# Patient Record
Sex: MALE | URBAN - METROPOLITAN AREA
[De-identification: names, ages, dates, MRNs, and addresses within clinical notes are randomized per-mention and may not be internally consistent; named-entity substitution may affect disease eponyms.]

---

## 2017-04-11 DIAGNOSIS — M54.2 CERVICALGIA: Primary | ICD-10-CM

## 2017-04-13 ENCOUNTER — OFFICE VISIT (OUTPATIENT)
Dept: ORTHOPEDICS | Facility: CLINIC | Age: 42
End: 2017-04-13

## 2017-04-13 VITALS — BODY MASS INDEX: 21.68 KG/M2 | HEIGHT: 64 IN | WEIGHT: 127 LBS

## 2017-04-13 DIAGNOSIS — R51.9 NONINTRACTABLE EPISODIC HEADACHE, UNSPECIFIED HEADACHE TYPE: ICD-10-CM

## 2017-04-13 DIAGNOSIS — M54.2 NECK PAIN: Primary | ICD-10-CM

## 2017-04-13 NOTE — MR AVS SNAPSHOT
After Visit Summary   4/13/2017    Loc     MRN: [id number]           Patient Information     Date Of Birth          1975        Visit Information        Provider Department      4/13/2017 1:00 PM Darren Sexton MD [facility]        Today's Diagnoses     Neck pain    -  1    Nonintractable episodic headache, unspecified headache type           Follow-ups after your visit        Your next 10 appointments already scheduled     Apr 21, 2017  1:45 PM CDT   (Arrive by 1:30 PM)   MR CERVICAL SPINE W/O CONTRAST with [id number]   [facility] ([facility])    [address] [address] [phone]           Take your medicines as usual, unless your doctor tells you not to. Bring a list of your current medicines to your exam (including vitamins, minerals and over-the-counter drugs). Also bring the results of similar scans you may have had.  Please remove any body piercings and hair extensions before you arrive.  Follow your doctor s orders. If you do not, we may have to postpone your exam.  You will not have contrast for this exam. You do not need to do anything special to prepare.  The MRI machine uses a strong magnet. Please wear clothes without metal (snaps, zippers). A sweatsuit works well, or we may give you a hospital gown.   **IMPORTANT** THE INSTRUCTIONS BELOW ARE ONLY FOR THOSE PATIENTS WHO HAVE BEEN TOLD THEY WILL RECEIVE SEDATION OR GENERAL ANESTHESIA DURING THEIR MRI PROCEDURE:  IF YOU WILL RECEIVE SEDATION (take medicine to help you relax during your exam):   You must get the medicine from your doctor before you arrive. Bring the medicine to the exam. Do not take it at home.   Arrive one hour early. Bring someone who can take you home after the test. Your medicine will make you sleepy. After the exam, you may not drive, take a bus or take a taxi by yourself.   No eating 8 hours before your exam.  You may have clear liquids up until 4 hours before your exam. (Clear liquids include water, clear tea, black coffee and fruit juice without pulp.)  IF YOU WILL RECEIVE ANESTHESIA (be asleep for your exam):   Arrive 1 1/2 hours early. Bring someone who can take you home after the test. You may not drive, take a bus or take a taxi by yourself.   No eating 8 hours before your exam. You may have clear liquids up until 4 hours before your exam. (Clear liquids include water, clear tea, black coffee and fruit juice without pulp.)   You will spend four to five hours in the recovery room.  Please call the Imaging Department at your exam site with any questions.            Apr 27, 2017 11:00 AM CDT   (Arrive by 10:30 AM)   RETURN NECK with Darren Sexton MD   [facility] ([facility])    [address] [place] [address]  [address] [facility] [id number] [phone]              Future tests that were ordered for you today     Open Future Orders        Priority Expected Expires Ordered    MRI Cervical spine w/o contrast Routine  4/13/2018 4/13/2017            Who to contact     Please call your clinic at [phone] to:    Ask questions about your health    Make or cancel appointments    Discuss your medicines    Learn about your test results    Speak to your doctor   If you have compliments or concerns about an experience at your clinic, or if you wish to file a complaint, please contact [facility] Patient Relations at [phone] or email us at [email or web address]         Additional Information About Your Visit        TocomailharAnedot Information     [employer] is an electronic gateway that provides easy, online access to your medical records. With [employer], you can request a clinic appointment, read your test results, renew a prescription or communicate with your care team.     To sign up for [employer] visit the website at [email or web address]  "  You will be asked to enter the access code listed below, as well as some personal information. Please follow the directions to create your username and password.     Your access code is: [email or web address]  Expires: 2017  6:30 AM     Your access code will  in 90 days. If you need help or a new code, please contact your [facility] or call [phone] for assistance.        Care EveryWhere ID     This is your Care EveryWhere ID. This could be used by other organizations to access your [facility] medical records  [id number]        Your Vitals Were     Height BMI (Body Mass Index)                1.632 m (5' 4.25\") 21.63 kg/m2           Blood Pressure from Last 3 Encounters:   No data found for BP    Weight from Last 3 Encounters:   17 57.6 kg (127 lb)               Primary Care Provider    No Ref-Primary Verified       No address on file        Thank you!     Thank you for choosing [facility]  for your care. Our goal is always to provide you with excellent care. Hearing back from our patients is one way we can continue to improve our services. Please take a few minutes to complete the written survey that you may receive in the mail after your visit with us. Thank you!             Your Updated Medication List - Protect others around you: Learn how to safely use, store and throw away your medicines at www.disposemymeds.org.          This list is accurate as of: 17  2:17 PM.  Always use your most recent med list.                   Brand Name Dispense Instructions for use    NORTRIPTYLINE HCL PO      Take 20 mg by mouth At Bedtime       TYLENOL PO      Take by mouth as needed for mild pain or fever         "

## 2017-04-13 NOTE — NURSING NOTE
MRI SAFETY CHECKLIST    1. Has the patient ever had brain, heart or inner ear surgery? NO    2. Has the patient every had any metal implanted in their body for any reason, including shrapnel or accidental injury? NO    3. Has the patient ever had metal removed from an eye due to welding, construction, etc.? NO    4. Does patient have Claustrophobia?  No   If yes, arrange for sedation prior to procedure and have patient arrange for transportation
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
## 2017-04-20 ENCOUNTER — TELEPHONE (OUTPATIENT)
Dept: ORTHOPEDICS | Facility: CLINIC | Age: 42
End: 2017-04-20

## 2017-04-25 ENCOUNTER — TELEPHONE (OUTPATIENT)
Dept: ORTHOPEDICS | Facility: CLINIC | Age: 42
End: 2017-04-25

## 2017-04-27 ENCOUNTER — OFFICE VISIT (OUTPATIENT)
Dept: ORTHOPEDICS | Facility: CLINIC | Age: 42
End: 2017-04-27

## 2017-04-27 VITALS — HEIGHT: 65 IN | BODY MASS INDEX: 21.33 KG/M2 | WEIGHT: 128 LBS

## 2017-04-27 DIAGNOSIS — R51.9 NONINTRACTABLE EPISODIC HEADACHE, UNSPECIFIED HEADACHE TYPE: ICD-10-CM

## 2017-04-27 DIAGNOSIS — M54.2 CERVICALGIA: Primary | ICD-10-CM

## 2017-04-27 NOTE — PROGRESS NOTES
REASON FOR VISIT: Discuss results of MRI    REFERRING PHYSICIAN: Laine Adair     PRIMARY CARE PHYSICIAN: Verified, No Ref-Primary    HISTORY OF PRESENT ILLNESS: Srini Moss is a 42 year old male who returns to clinic today to discuss the results of his cervical MRI.  He was last seen on 4/13/17, and he states that his symptoms have not changed since that time.  He continues to complain of episodic left occiput headaches lasting a few seconds at a time.  He also still has left shoulder/trapezius pain that limits his ability to work as a .     Oswestry score: 28% (previously 38%)  [id number]: 24 (previously 24)  Pain scale: 4 (previously 4)      IMAGING: MRI of the cervical spine was obtained on 4/21/17.  It shows multilevel disc degeneration. Congenital narrowing of the spinal canal. See full radiologic report in chart.    CLINICAL ASSESSMENT:   1. Multilevel cervical spondylosis  2. Possible cervicogenic headaches, secondary to #1    DISCUSSION/PLAN:   Srini is a 42 year old male who returned to clinic today to discuss the results of his recent cervical MRI scan.  He has had no change in symptoms since he was last seen two weeks ago.  We discussed the results of the MRI in detail today, which shows multilevel disc degeneration and a congenitally narrow canal.  We discussed the possibility of cervicogenic headaches secondary to the disc degeneration.  However, this is a diagnosis of exclusion, and there may be other causes of his headaches.  We discussed the risk versus benefit of cervical fusion.  This would include multiple levels of the cervical spine, which would not be well tolerated, particularly given his work as a .  We discussed other potential complications of surgery, including wound complications and difficulty swallowing.  The risks outweigh the benefits at this time, and we do not recommend surgery.  We can consider an RAMON at C7-T1, and this was ordered today.  If he does not  get relief, the next step is further evaluation in the pain clinic and by neurology.  If his symptoms worsen, and he starts to develop clumsiness, extremity weakness or numbness, or other neurologic symptoms, he should return to clinic for further evaluation.  He can follow-up as needed.    All questions and concerns were answered to the patient's apparent satisfaction before leaving the clinic.     Thank you for allowing Dr. Sexton and I to participate in the care of Loc Vo.    Respectfully,  Patricia Betancur PA-C    CC  Copy to patient    [address] [facility] [address]

## 2017-04-27 NOTE — LETTER
4/27/2017       RE: Srini Moss  [address] [facility] [address]     Dear Colleague,    Thank you for referring your patient, Srini Moss, to the [facility]TH[facility] at [facility]. Please see a copy of my visit note below.    REASON FOR VISIT: Discuss results of MRI    REFERRING PHYSICIAN: Laine Adair     PRIMARY CARE PHYSICIAN: Verified, No Ref-Primary    HISTORY OF PRESENT ILLNESS: Srini Moss is a 42 year old male who returns to clinic today to discuss the results of his cervical MRI.  He was last seen on 4/13/17, and he states that his symptoms have not changed since that time.  He continues to complain of episodic left occiput headaches lasting a few seconds at a time.  He also still has left shoulder/trapezius pain that limits his ability to work as a .     Oswestry score: 28% (previously 38%)  Eugqhg60: 24 (previously 24)  Pain scale: 4 (previously 4)      IMAGING: MRI of the cervical spine was obtained on 4/21/17.  It shows multilevel disc degeneration. Congenital narrowing of the spinal canal. See full radiologic report in chart.    CLINICAL ASSESSMENT:   1. Multilevel cervical spondylosis  2. Possible cervicogenic headaches, secondary to #1    DISCUSSION/PLAN:   Srini is a 42 year old male who returned to clinic today to discuss the results of his recent cervical MRI scan.  He has had no change in symptoms since he was last seen two weeks ago.  We discussed the results of the MRI in detail today, which shows multilevel disc degeneration and a congenitally narrow canal.  We discussed the possibility of cervicogenic headaches secondary to the disc degeneration.  However, this is a diagnosis of exclusion, and there may be other causes of his headaches.  We discussed the risk versus benefit of cervical fusion.  This would include multiple levels of the cervical spine, which would not be well tolerated, particularly given his work as a .  We  discussed other potential complications of surgery, including wound complications and difficulty swallowing.  The risks outweigh the benefits at this time, and we do not recommend surgery.  We can consider an RAMON at C7-T1, and this was ordered today.  If he does not get relief, the next step is further evaluation in the pain clinic and by neurology.  If his symptoms worsen, and he starts to develop clumsiness, extremity weakness or numbness, or other neurologic symptoms, he should return to clinic for further evaluation.  He can follow-up as needed.    All questions and concerns were answered to the patient's apparent satisfaction before leaving the clinic.     Thank you for allowing Dr. Sexton and I to participate in the care of Loc Vo.    Respectfully,  Patricia Betancur PA-C    CC  Copy to patient    [address] [facility] [address]      Again, thank you for allowing me to participate in the care of your patient.      Sincerely,    Darren Sexton MD

## 2017-04-27 NOTE — LETTER
4/27/2017       RE: Srini Moss  [address] [facility] [address]     Dear Colleague,    Thank you for referring your patient, Srini Moss, to the [facility] at [facility]. Please see a copy of my visit note below.    REASON FOR VISIT: Discuss results of MRI    REFERRING PHYSICIAN: Laine Adair     PRIMARY CARE PHYSICIAN: Verified, No Ref-Primary    HISTORY OF PRESENT ILLNESS: Srini Moss is a 42 year old male who returns to clinic today to discuss the results of his cervical MRI.  He was last seen on 4/13/17, and he states that his symptoms have not changed since that time.  He continues to complain of episodic left occiput headaches lasting a few seconds at a time.  He also still has left shoulder/trapezius pain that limits his ability to work as a .     Oswestry score: 28% (previously 38%)  Vhduqm35: 24 (previously 24)  Pain scale: 4 (previously 4)      IMAGING: MRI of the cervical spine was obtained on 4/21/17.  It shows multilevel disc degeneration. Congenital narrowing of the spinal canal. See full radiologic report in chart.    CLINICAL ASSESSMENT:   1. Multilevel cervical spondylosis  2. Possible cervicogenic headaches, secondary to #1    DISCUSSION/PLAN:   Srini is a 42 year old male who returned to clinic today to discuss the results of his recent cervical MRI scan.  He has had no change in symptoms since he was last seen two weeks ago.  We discussed the results of the MRI in detail today, which shows multilevel disc degeneration and a congenitally narrow canal.  We discussed the possibility of cervicogenic headaches secondary to the disc degeneration.  However, this is a diagnosis of exclusion, and there may be other causes of his headaches.  We discussed the risk versus benefit of cervical fusion.  This would include multiple levels of the cervical spine, which would not be well tolerated, particularly given his work as a .  We  discussed other potential complications of surgery, including wound complications and difficulty swallowing.  The risks outweigh the benefits at this time, and we do not recommend surgery.  We can consider an RAMON at C7-T1, and this was ordered today.  If he does not get relief, the next step is further evaluation in the pain clinic and by neurology.  If his symptoms worsen, and he starts to develop clumsiness, extremity weakness or numbness, or other neurologic symptoms, he should return to clinic for further evaluation.  He can follow-up as needed.    All questions and concerns were answered to the patient's apparent satisfaction before leaving the clinic.     Thank you for allowing Dr. Sexton and I to participate in the care of Loc Vo.    Respectfully,  Patricia Betancur PA-C    CC  Copy to patient    [address] [facility] [address]      Again, thank you for allowing me to participate in the care of your patient.      Sincerely,    Darren Sexton MD
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
## 2017-04-27 NOTE — MR AVS SNAPSHOT
After Visit Summary   2017    Loc Vo    MRN: [id number]           Patient Information     Date Of Birth          1975        Visit Information        Provider Department      2017 11:00 AM Darren Sexton MD [facility]        Today's Diagnoses     Cervicalgia    -  1    Nonintractable episodic headache, unspecified headache type           Follow-ups after your visit        Follow-up notes from your care team     Return if symptoms worsen or fail to improve.      Who to contact     Please call your clinic at [phone] to:    Ask questions about your health    Make or cancel appointments    Discuss your medicines    Learn about your test results    Speak to your doctor   If you have compliments or concerns about an experience at your clinic, or if you wish to file a complaint, please contact [facility] Patient Relations at [phone] or email us at [email or web address]         Additional Information About Your Visit        MyChart Information     Viralitit is an electronic gateway that provides easy, online access to your medical records. With Pollfish, you can request a clinic appointment, read your test results, renew a prescription or communicate with your care team.     To sign up for Viralitit visit the website at [email or web address]   You will be asked to enter the access code listed below, as well as some personal information. Please follow the directions to create your username and password.     Your access code is: [email or web address]  Expires: 2017  6:30 AM     Your access code will  in 90 days. If you need help or a new code, please contact your [facility] Clinic or call [phone] for assistance.        Care EveryWhere ID     This is your Care EveryWhere ID. This could be used by other organizations to access your [place] medical records  [id number]        Your Vitals Were   "   Height BMI (Body Mass Index)                1.651 m (5' 5\") 21.3 kg/m2           Blood Pressure from Last 3 Encounters:   No data found for BP    Weight from Last 3 Encounters:   No data found for Wt              Today, you had the following     No orders found for display       Primary Care Provider    No Ref-Primary Verified       No address on file        Thank you!     Thank you for choosing [facility]  for your care. Our goal is always to provide you with excellent care. Hearing back from our patients is one way we can continue to improve our services. Please take a few minutes to complete the written survey that you may receive in the mail after your visit with us. Thank you!             Your Updated Medication List - Protect others around you: Learn how to safely use, store and throw away your medicines at www.disposemymeds.org.          This list is accurate as of: 4/27/17 11:59 PM.  Always use your most recent med list.                   Brand Name Dispense Instructions for use    NORTRIPTYLINE HCL PO      Take 20 mg by mouth At Bedtime       TYLENOL PO      Take by mouth as needed for mild pain or fever         "

## 2017-04-27 NOTE — Clinical Note
4/27/2017      RE: Loc Vo  [address] [facility] [address]       No notes on file    Darren Sexton MD